# Patient Record
Sex: MALE | Race: WHITE | NOT HISPANIC OR LATINO | URBAN - METROPOLITAN AREA
[De-identification: names, ages, dates, MRNs, and addresses within clinical notes are randomized per-mention and may not be internally consistent; named-entity substitution may affect disease eponyms.]

---

## 2023-04-15 DIAGNOSIS — F51.01 PRIMARY INSOMNIA: Primary | ICD-10-CM

## 2023-04-17 RX ORDER — TRAZODONE HYDROCHLORIDE 100 MG/1
TABLET ORAL
Qty: 90 TABLET | Refills: 1 | Status: SHIPPED | OUTPATIENT
Start: 2023-04-17 | End: 2023-10-12

## 2023-05-01 ENCOUNTER — APPOINTMENT (OUTPATIENT)
Dept: PRIMARY CARE | Facility: CLINIC | Age: 63
End: 2023-05-01
Payer: COMMERCIAL

## 2023-05-05 ENCOUNTER — TELEPHONE (OUTPATIENT)
Dept: PRIMARY CARE | Facility: CLINIC | Age: 63
End: 2023-05-05
Payer: COMMERCIAL

## 2023-05-05 LAB
ALANINE AMINOTRANSFERASE (SGPT) (U/L) IN SER/PLAS: 11 U/L (ref 10–52)
ALBUMIN (G/DL) IN SER/PLAS: 4.4 G/DL (ref 3.4–5)
ALKALINE PHOSPHATASE (U/L) IN SER/PLAS: 68 U/L (ref 33–136)
ANION GAP IN SER/PLAS: 12 MMOL/L (ref 10–20)
ASPARTATE AMINOTRANSFERASE (SGOT) (U/L) IN SER/PLAS: 17 U/L (ref 9–39)
BASOPHILS (10*3/UL) IN BLOOD BY AUTOMATED COUNT: 0.06 X10E9/L (ref 0–0.1)
BASOPHILS/100 LEUKOCYTES IN BLOOD BY AUTOMATED COUNT: 1 % (ref 0–2)
BILIRUBIN TOTAL (MG/DL) IN SER/PLAS: 0.6 MG/DL (ref 0–1.2)
CALCIUM (MG/DL) IN SER/PLAS: 9.6 MG/DL (ref 8.6–10.3)
CARBON DIOXIDE, TOTAL (MMOL/L) IN SER/PLAS: 26 MMOL/L (ref 21–32)
CHLORIDE (MMOL/L) IN SER/PLAS: 104 MMOL/L (ref 98–107)
CREATININE (MG/DL) IN SER/PLAS: 2.22 MG/DL (ref 0.5–1.3)
EOSINOPHILS (10*3/UL) IN BLOOD BY AUTOMATED COUNT: 0.25 X10E9/L (ref 0–0.7)
EOSINOPHILS/100 LEUKOCYTES IN BLOOD BY AUTOMATED COUNT: 4.1 % (ref 0–6)
ERYTHROCYTE DISTRIBUTION WIDTH (RATIO) BY AUTOMATED COUNT: 13.6 % (ref 11.5–14.5)
ERYTHROCYTE MEAN CORPUSCULAR HEMOGLOBIN CONCENTRATION (G/DL) BY AUTOMATED: 32.5 G/DL (ref 32–36)
ERYTHROCYTE MEAN CORPUSCULAR VOLUME (FL) BY AUTOMATED COUNT: 95 FL (ref 80–100)
ERYTHROCYTES (10*6/UL) IN BLOOD BY AUTOMATED COUNT: 4.15 X10E12/L (ref 4.5–5.9)
GFR MALE: 33 ML/MIN/1.73M2
GLUCOSE (MG/DL) IN SER/PLAS: 111 MG/DL (ref 74–99)
HEMATOCRIT (%) IN BLOOD BY AUTOMATED COUNT: 39.4 % (ref 41–52)
HEMOGLOBIN (G/DL) IN BLOOD: 12.8 G/DL (ref 13.5–17.5)
IMMATURE GRANULOCYTES/100 LEUKOCYTES IN BLOOD BY AUTOMATED COUNT: 0.3 % (ref 0–0.9)
LEUKOCYTES (10*3/UL) IN BLOOD BY AUTOMATED COUNT: 6.2 X10E9/L (ref 4.4–11.3)
LYMPHOCYTES (10*3/UL) IN BLOOD BY AUTOMATED COUNT: 2.96 X10E9/L (ref 1.2–4.8)
LYMPHOCYTES/100 LEUKOCYTES IN BLOOD BY AUTOMATED COUNT: 48.1 % (ref 13–44)
MONOCYTES (10*3/UL) IN BLOOD BY AUTOMATED COUNT: 0.51 X10E9/L (ref 0.1–1)
MONOCYTES/100 LEUKOCYTES IN BLOOD BY AUTOMATED COUNT: 8.3 % (ref 2–10)
NEUTROPHILS (10*3/UL) IN BLOOD BY AUTOMATED COUNT: 2.35 X10E9/L (ref 1.2–7.7)
NEUTROPHILS/100 LEUKOCYTES IN BLOOD BY AUTOMATED COUNT: 38.2 % (ref 40–80)
NRBC (PER 100 WBCS) BY AUTOMATED COUNT: 0 /100 WBC (ref 0–0)
PLATELETS (10*3/UL) IN BLOOD AUTOMATED COUNT: 274 X10E9/L (ref 150–450)
POTASSIUM (MMOL/L) IN SER/PLAS: 3.9 MMOL/L (ref 3.5–5.3)
PROSTATE SPECIFIC ANTIGEN,SCREEN: 1.61 NG/ML (ref 0–4)
PROTEIN TOTAL: 6.9 G/DL (ref 6.4–8.2)
SODIUM (MMOL/L) IN SER/PLAS: 138 MMOL/L (ref 136–145)
UREA NITROGEN (MG/DL) IN SER/PLAS: 29 MG/DL (ref 6–23)

## 2023-05-05 NOTE — TELEPHONE ENCOUNTER
----- Message from Karla Medeiros MA sent at 5/5/2023 10:31 AM EDT -----  Pt had OV scheduled in [facility] for 5/1.  He is due for OV.  Please schedule. Thanks, CG  ----- Message -----  From: Js Villareal MD  Sent: 5/5/2023  10:13 AM EDT  To: #    I got blood work for this patient, but I do not see an upcoming visit.  Please check thank you

## 2023-06-05 ENCOUNTER — OFFICE VISIT (OUTPATIENT)
Dept: PRIMARY CARE | Facility: CLINIC | Age: 63
End: 2023-06-05
Payer: COMMERCIAL

## 2023-06-05 VITALS
OXYGEN SATURATION: 98 % | DIASTOLIC BLOOD PRESSURE: 80 MMHG | HEART RATE: 76 BPM | BODY MASS INDEX: 21.86 KG/M2 | WEIGHT: 148 LBS | SYSTOLIC BLOOD PRESSURE: 124 MMHG | TEMPERATURE: 98.2 F

## 2023-06-05 DIAGNOSIS — J42 CHRONIC BRONCHITIS, UNSPECIFIED CHRONIC BRONCHITIS TYPE (MULTI): ICD-10-CM

## 2023-06-05 DIAGNOSIS — I73.9 PERIPHERAL ARTERIAL DISEASE (CMS-HCC): ICD-10-CM

## 2023-06-05 DIAGNOSIS — I10 ESSENTIAL HYPERTENSION: ICD-10-CM

## 2023-06-05 DIAGNOSIS — F41.1 GENERALIZED ANXIETY DISORDER: Primary | ICD-10-CM

## 2023-06-05 DIAGNOSIS — R06.81 WITNESSED APNEIC SPELLS: ICD-10-CM

## 2023-06-05 DIAGNOSIS — N18.32 STAGE 3B CHRONIC KIDNEY DISEASE (MULTI): ICD-10-CM

## 2023-06-05 DIAGNOSIS — I25.10 ARTERIOSCLEROTIC CARDIOVASCULAR DISEASE: ICD-10-CM

## 2023-06-05 PROBLEM — K21.9 GASTROESOPHAGEAL REFLUX DISEASE: Status: ACTIVE | Noted: 2023-06-05

## 2023-06-05 PROBLEM — I70.221: Status: ACTIVE | Noted: 2023-06-05

## 2023-06-05 PROBLEM — J44.9 CHRONIC OBSTRUCTIVE LUNG DISEASE (MULTI): Status: ACTIVE | Noted: 2021-03-18

## 2023-06-05 PROBLEM — I65.29 CAROTID ARTERY STENOSIS: Status: ACTIVE | Noted: 2023-06-05

## 2023-06-05 PROCEDURE — 3074F SYST BP LT 130 MM HG: CPT | Performed by: FAMILY MEDICINE

## 2023-06-05 PROCEDURE — 3079F DIAST BP 80-89 MM HG: CPT | Performed by: FAMILY MEDICINE

## 2023-06-05 PROCEDURE — 99214 OFFICE O/P EST MOD 30 MIN: CPT | Performed by: FAMILY MEDICINE

## 2023-06-05 RX ORDER — ATORVASTATIN CALCIUM 80 MG/1
80 TABLET, FILM COATED ORAL DAILY
COMMUNITY
End: 2023-11-29 | Stop reason: SDUPTHER

## 2023-06-05 RX ORDER — LISINOPRIL AND HYDROCHLOROTHIAZIDE 12.5; 2 MG/1; MG/1
1 TABLET ORAL DAILY
COMMUNITY
End: 2023-06-05 | Stop reason: ALTCHOICE

## 2023-06-05 RX ORDER — CLOPIDOGREL BISULFATE 75 MG/1
TABLET ORAL DAILY
COMMUNITY
End: 2023-12-26 | Stop reason: SDUPTHER

## 2023-06-05 RX ORDER — CILOSTAZOL 100 MG/1
100 TABLET ORAL 2 TIMES DAILY
COMMUNITY
Start: 2018-01-24

## 2023-06-05 RX ORDER — ASPIRIN 81 MG/1
81 TABLET ORAL DAILY
COMMUNITY
Start: 2015-12-16

## 2023-06-05 RX ORDER — LISINOPRIL 20 MG/1
20 TABLET ORAL DAILY
Qty: 90 TABLET | Refills: 1 | Status: SHIPPED | OUTPATIENT
Start: 2023-06-05 | End: 2023-12-14 | Stop reason: SDUPTHER

## 2023-06-05 RX ORDER — FLUOXETINE HYDROCHLORIDE 40 MG/1
40 CAPSULE ORAL DAILY
COMMUNITY
Start: 2022-11-07

## 2023-06-05 ASSESSMENT — ENCOUNTER SYMPTOMS: DEPRESSION: 1

## 2023-06-05 NOTE — PROGRESS NOTES
Subjective   Patient ID: Hung Gaitan is a 62 y.o. male who presents for Depression (Recheck. Review labs.).  Depression      1. Depression/PTSD: doing fair.  Still reminded by memories of his son.    2. HTN: runs a bit low times.    3. lipids: well controlled    4. PAD: stable    5. CRI: Cr was getting worse.  Seeing nephrology.       Current Outpatient Medications on File Prior to Visit   Medication Sig Dispense Refill    aspirin 81 mg EC tablet Take 1 tablet (81 mg) by mouth once daily.      atorvastatin (Lipitor) 80 mg tablet Take 1 tablet (80 mg) by mouth once daily.      clopidogrel (Plavix) 75 mg tablet Take by mouth once daily.      traZODone (Desyrel) 100 mg tablet TAKE 1 TABLET BY MOUTH EVERYDAY AT BEDTIME 90 tablet 1    [DISCONTINUED] lisinopriL-hydrochlorothiazide 20-12.5 mg tablet Take 1 tablet by mouth once daily.      cilostazol (Pletal) 100 mg tablet Take 1 tablet (100 mg) by mouth 2 times a day.      FLUoxetine (PROzac) 40 mg capsule Take 1 capsule (40 mg) by mouth once daily.      hydrocortisone acetate 2.5 % cream with perineal applicator Apply topically.       No current facility-administered medications on file prior to visit.        Vitals:    06/05/23 1452   BP: 124/80   Pulse: 76   Temp: 36.8 °C (98.2 °F)   SpO2: 98%       Review of Systems   Psychiatric/Behavioral:  Positive for depression.    All other systems reviewed and are negative.      Objective     Physical Exam  Constitutional:       Appearance: Normal appearance. He is well-developed.   HENT:      Head: Atraumatic.   Cardiovascular:      Rate and Rhythm: Normal rate and regular rhythm.      Heart sounds: Normal heart sounds. No murmur heard.  Pulmonary:      Effort: Pulmonary effort is normal.      Breath sounds: Normal breath sounds.   Abdominal:      General: Bowel sounds are normal.      Palpations: Abdomen is soft.   Skin:     General: Skin is warm.   Neurological:      General: No focal deficit present.      Mental Status:  He is alert.   Psychiatric:         Mood and Affect: Mood normal.         Orders Only on 05/05/2023   Component Date Value Ref Range Status    Prostate Specific Antigen,Screen 05/05/2023 1.61  0.00 - 4.00 ng/mL Final    Comment: The FDA requires that the method used for PSA assay be   reported to the physician. Values obtained with different   assay methods must not be used interchangeably. This test   was performed at [facility] using the Siemens  [employer] PSA method, which is a sandwich immunoassay using   chemiluminescence for quantitation. The assay is approved  for measurement of prostate-specific antigen (PSA) in   serum and may be used in conjunction with a digital rectal  examination in men 50 years and older as an aid in   detection of prostate cancer.   5-Alpha-reductase inhibitors (e.g. Proscar, Finasteride,   Avodart, Dutasteride and Mercy) for the treatment of BPH   have been shown to lower PSA levels by an average of 50%   after 6 months of treatment.     Orders Only on 05/05/2023   Component Date Value Ref Range Status    Glucose 05/05/2023 111 (H)  74 - 99 mg/dL Final    Sodium 05/05/2023 138  136 - 145 mmol/L Final    Potassium 05/05/2023 3.9  3.5 - 5.3 mmol/L Final    Chloride 05/05/2023 104  98 - 107 mmol/L Final    Bicarbonate 05/05/2023 26  21 - 32 mmol/L Final    Anion Gap 05/05/2023 12  10 - 20 mmol/L Final    Urea Nitrogen 05/05/2023 29 (H)  6 - 23 mg/dL Final    Creatinine 05/05/2023 2.22 (H)  0.50 - 1.30 mg/dL Final    GFR MALE 05/05/2023 33 (A)  >90 mL/min/1.73m2 Final    Comment:  CALCULATIONS OF ESTIMATED GFR ARE PERFORMED   USING THE 2021 CKD-EPI STUDY REFIT EQUATION   WITHOUT THE RACE VARIABLE FOR THE IDMS-TRACEABLE   CREATININE METHODS.    https://jasn.asnjournals.org/content/early/2021/09/22/ASN.5881031628      Calcium 05/05/2023 9.6  8.6 - 10.3 mg/dL Final    Albumin 05/05/2023 4.4  3.4 - 5.0 g/dL Final    Alkaline Phosphatase 05/05/2023 68  33 - 136 U/L Final     Total Protein 05/05/2023 6.9  6.4 - 8.2 g/dL Final    AST 05/05/2023 17  9 - 39 U/L Final    Total Bilirubin 05/05/2023 0.6  0.0 - 1.2 mg/dL Final    ALT (SGPT) 05/05/2023 11  10 - 52 U/L Final    Comment:  Patients treated with Sulfasalazine may generate    falsely decreased results for ALT.     Orders Only on 05/05/2023   Component Date Value Ref Range Status    WBC 05/05/2023 6.2  4.4 - 11.3 x10E9/L Final    nRBC 05/05/2023 0.0  0.0 - 0.0 /100 WBC Final    RBC 05/05/2023 4.15 (L)  4.50 - 5.90 x10E12/L Final    Hemoglobin 05/05/2023 12.8 (L)  13.5 - 17.5 g/dL Final    Hematocrit 05/05/2023 39.4 (L)  41.0 - 52.0 % Final    MCV 05/05/2023 95  80 - 100 fL Final    MCHC 05/05/2023 32.5  32.0 - 36.0 g/dL Final    Platelets 05/05/2023 274  150 - 450 x10E9/L Final    RDW 05/05/2023 13.6  11.5 - 14.5 % Final    Neutrophils % 05/05/2023 38.2  40.0 - 80.0 % Final    Immature Granulocytes %, Automated 05/05/2023 0.3  0.0 - 0.9 % Final    Comment:  Immature Granulocyte Count (IG) includes promyelocytes,    myelocytes and metamyelocytes but does not include bands.   Percent differential counts (%) should be interpreted in the   context of the absolute cell counts (cells/L).      Lymphocytes % 05/05/2023 48.1  13.0 - 44.0 % Final    Monocytes % 05/05/2023 8.3  2.0 - 10.0 % Final    Eosinophils % 05/05/2023 4.1  0.0 - 6.0 % Final    Basophils % 05/05/2023 1.0  0.0 - 2.0 % Final    Neutrophils Absolute 05/05/2023 2.35  1.20 - 7.70 x10E9/L Final    Lymphocytes Absolute 05/05/2023 2.96  1.20 - 4.80 x10E9/L Final    Monocytes Absolute 05/05/2023 0.51  0.10 - 1.00 x10E9/L Final    Eosinophils Absolute 05/05/2023 0.25  0.00 - 0.70 x10E9/L Final    Basophils Absolute 05/05/2023 0.06  0.00 - 0.10 x10E9/L Final       Assessment/Plan   Problem List Items Addressed This Visit       Generalized anxiety disorder - Primary    Arteriosclerotic cardiovascular disease    Chronic obstructive lung disease (CMS/HCC)    Essential hypertension     Peripheral arterial disease (CMS/HCC)     Other Visit Diagnoses       Stage 3b chronic kidney disease              Patient is doing well.  Refilled pts meds.  Follow up in 6 mo.

## 2023-08-25 DIAGNOSIS — R06.81 WITNESSED APNEIC SPELLS: ICD-10-CM

## 2023-09-20 ENCOUNTER — OFFICE VISIT (OUTPATIENT)
Dept: PRIMARY CARE | Facility: CLINIC | Age: 63
End: 2023-09-20
Payer: COMMERCIAL

## 2023-09-20 VITALS
TEMPERATURE: 98.8 F | HEART RATE: 77 BPM | DIASTOLIC BLOOD PRESSURE: 72 MMHG | BODY MASS INDEX: 22 KG/M2 | OXYGEN SATURATION: 98 % | WEIGHT: 149 LBS | SYSTOLIC BLOOD PRESSURE: 112 MMHG

## 2023-09-20 DIAGNOSIS — G47.33 OBSTRUCTIVE SLEEP APNEA SYNDROME: Primary | ICD-10-CM

## 2023-09-20 PROCEDURE — 3078F DIAST BP <80 MM HG: CPT | Performed by: FAMILY MEDICINE

## 2023-09-20 PROCEDURE — 99213 OFFICE O/P EST LOW 20 MIN: CPT | Performed by: FAMILY MEDICINE

## 2023-09-20 PROCEDURE — 3074F SYST BP LT 130 MM HG: CPT | Performed by: FAMILY MEDICINE

## 2023-09-20 NOTE — PROGRESS NOTES
Subjective   Patient ID: Hung Gaitan is a 63 y.o. male who presents for Review Sleep Study Results (Discuss Sleep study results).  HPI patient presents for follow-up of his sleep study results.  His HSAT showed moderate sleep apnea.  They recommended a trial of CPAP.  Patient reports he frequently does not feel well rested when he wakes up in the morning.  Has had witnessed apneas in the past.  Has not had anything about a CPAP prescription yet.    Patient Active Problem List   Diagnosis    Generalized anxiety disorder    Arteriosclerotic cardiovascular disease    Attention deficit hyperactivity disorder, predominantly inattentive type    Carotid artery stenosis    Chronic obstructive lung disease (CMS/HCC)    Essential hypertension    Gastroesophageal reflux disease    Major depressive disorder    Mixed hyperlipidemia    Atherosclerosis of native artery of right leg with rest pain (CMS/HCC)    Peripheral arterial disease (CMS/HCC)       Social Connections: Not on file       Current Outpatient Medications on File Prior to Visit   Medication Sig Dispense Refill    aspirin 81 mg EC tablet Take 1 tablet (81 mg) by mouth once daily.      atorvastatin (Lipitor) 80 mg tablet Take 1 tablet (80 mg) by mouth once daily.      cilostazol (Pletal) 100 mg tablet Take 1 tablet (100 mg) by mouth 2 times a day.      clopidogrel (Plavix) 75 mg tablet Take by mouth once daily.      FLUoxetine (PROzac) 40 mg capsule Take 1 capsule (40 mg) by mouth once daily.      hydrocortisone acetate 2.5 % cream with perineal applicator Apply topically.      lisinopril 20 mg tablet Take 1 tablet (20 mg) by mouth once daily. 90 tablet 1    traZODone (Desyrel) 100 mg tablet TAKE 1 TABLET BY MOUTH EVERYDAY AT BEDTIME 90 tablet 1     No current facility-administered medications on file prior to visit.        Vitals:    09/20/23 1511   BP: 112/72   Pulse: 77   Temp: 37.1 °C (98.8 °F)   SpO2: 98%     Vitals:    09/20/23 1511   Weight: 67.6 kg (149  lb)       Review of Systems   All other systems reviewed and are negative.      Objective     Physical Exam  Constitutional:       Appearance: Normal appearance. He is well-developed.   HENT:      Head: Atraumatic.   Cardiovascular:      Rate and Rhythm: Normal rate and regular rhythm.      Heart sounds: Normal heart sounds. No murmur heard.  Pulmonary:      Effort: Pulmonary effort is normal.      Breath sounds: Normal breath sounds.   Abdominal:      General: Bowel sounds are normal.      Palpations: Abdomen is soft.   Skin:     General: Skin is warm.   Neurological:      General: No focal deficit present.      Mental Status: He is alert.   Psychiatric:         Mood and Affect: Mood normal.         No visits with results within 2 Month(s) from this visit.   Latest known visit with results is:   Orders Only on 05/05/2023   Component Date Value Ref Range Status    Prostate Specific Antigen,Screen 05/05/2023 1.61  0.00 - 4.00 ng/mL Final    Comment: The FDA requires that the method used for PSA assay be   reported to the physician. Values obtained with different   assay methods must not be used interchangeably. This test   was performed at [facility] using the Siemens  [employer] PSA method, which is a sandwich immunoassay using   chemiluminescence for quantitation. The assay is approved  for measurement of prostate-specific antigen (PSA) in   serum and may be used in conjunction with a digital rectal  examination in men 50 years and older as an aid in   detection of prostate cancer.   5-Alpha-reductase inhibitors (e.g. Proscar, Finasteride,   Avodart, Dutasteride and Mercy) for the treatment of BPH   have been shown to lower PSA levels by an average of 50%   after 6 months of treatment.         Assessment/Plan   Problem List Items Addressed This Visit    None  Visit Diagnoses       Obstructive sleep apnea syndrome    -  Primary    Relevant Orders    Positive Airway Pressure (PAP) Therapy           Ordered  CPAP machine for patient

## 2023-10-02 ENCOUNTER — APPOINTMENT (OUTPATIENT)
Dept: VASCULAR MEDICINE | Facility: HOSPITAL | Age: 63
End: 2023-10-02
Payer: COMMERCIAL

## 2023-10-02 ENCOUNTER — APPOINTMENT (OUTPATIENT)
Dept: VASCULAR SURGERY | Facility: HOSPITAL | Age: 63
End: 2023-10-02
Payer: COMMERCIAL

## 2023-10-12 DIAGNOSIS — F51.01 PRIMARY INSOMNIA: ICD-10-CM

## 2023-10-12 RX ORDER — TRAZODONE HYDROCHLORIDE 100 MG/1
TABLET ORAL
Qty: 90 TABLET | Refills: 1 | Status: SHIPPED | OUTPATIENT
Start: 2023-10-12 | End: 2024-03-11

## 2023-11-24 ENCOUNTER — LAB (OUTPATIENT)
Dept: LAB | Facility: LAB | Age: 63
End: 2023-11-24
Payer: COMMERCIAL

## 2023-11-24 DIAGNOSIS — I10 ESSENTIAL HYPERTENSION: ICD-10-CM

## 2023-11-24 LAB
ALBUMIN SERPL BCP-MCNC: 4.3 G/DL (ref 3.4–5)
ALP SERPL-CCNC: 72 U/L (ref 33–136)
ALT SERPL W P-5'-P-CCNC: 10 U/L (ref 10–52)
ANION GAP SERPL CALC-SCNC: 13 MMOL/L (ref 10–20)
AST SERPL W P-5'-P-CCNC: 17 U/L (ref 9–39)
BASOPHILS # BLD AUTO: 0.09 X10*3/UL (ref 0–0.1)
BASOPHILS NFR BLD AUTO: 1.6 %
BILIRUB SERPL-MCNC: 0.7 MG/DL (ref 0–1.2)
BUN SERPL-MCNC: 21 MG/DL (ref 6–23)
CALCIUM SERPL-MCNC: 9.1 MG/DL (ref 8.6–10.3)
CHLORIDE SERPL-SCNC: 107 MMOL/L (ref 98–107)
CO2 SERPL-SCNC: 25 MMOL/L (ref 21–32)
CREAT SERPL-MCNC: 1.86 MG/DL (ref 0.5–1.3)
EOSINOPHIL # BLD AUTO: 0.23 X10*3/UL (ref 0–0.7)
EOSINOPHIL NFR BLD AUTO: 4.2 %
ERYTHROCYTE [DISTWIDTH] IN BLOOD BY AUTOMATED COUNT: 14.5 % (ref 11.5–14.5)
GFR SERPL CREATININE-BSD FRML MDRD: 40 ML/MIN/1.73M*2
GLUCOSE SERPL-MCNC: 91 MG/DL (ref 74–99)
HCT VFR BLD AUTO: 43.4 % (ref 41–52)
HGB BLD-MCNC: 14.4 G/DL (ref 13.5–17.5)
IMM GRANULOCYTES # BLD AUTO: 0.01 X10*3/UL (ref 0–0.7)
IMM GRANULOCYTES NFR BLD AUTO: 0.2 % (ref 0–0.9)
LYMPHOCYTES # BLD AUTO: 2.61 X10*3/UL (ref 1.2–4.8)
LYMPHOCYTES NFR BLD AUTO: 47.1 %
MCH RBC QN AUTO: 31.8 PG (ref 26–34)
MCHC RBC AUTO-ENTMCNC: 33.2 G/DL (ref 32–36)
MCV RBC AUTO: 96 FL (ref 80–100)
MONOCYTES # BLD AUTO: 0.48 X10*3/UL (ref 0.1–1)
MONOCYTES NFR BLD AUTO: 8.7 %
NEUTROPHILS # BLD AUTO: 2.12 X10*3/UL (ref 1.2–7.7)
NEUTROPHILS NFR BLD AUTO: 38.2 %
NRBC BLD-RTO: 0 /100 WBCS (ref 0–0)
PLATELET # BLD AUTO: 280 X10*3/UL (ref 150–450)
POTASSIUM SERPL-SCNC: 4.7 MMOL/L (ref 3.5–5.3)
PROT SERPL-MCNC: 6.7 G/DL (ref 6.4–8.2)
RBC # BLD AUTO: 4.53 X10*6/UL (ref 4.5–5.9)
SODIUM SERPL-SCNC: 140 MMOL/L (ref 136–145)
WBC # BLD AUTO: 5.5 X10*3/UL (ref 4.4–11.3)

## 2023-11-24 PROCEDURE — 80053 COMPREHEN METABOLIC PANEL: CPT

## 2023-11-24 PROCEDURE — 85025 COMPLETE CBC W/AUTO DIFF WBC: CPT

## 2023-11-24 PROCEDURE — 36415 COLL VENOUS BLD VENIPUNCTURE: CPT

## 2023-11-28 DIAGNOSIS — I10 ESSENTIAL HYPERTENSION: ICD-10-CM

## 2023-11-29 DIAGNOSIS — I25.10 ARTERIOSCLEROTIC CARDIOVASCULAR DISEASE: Primary | ICD-10-CM

## 2023-11-29 RX ORDER — LISINOPRIL 20 MG/1
20 TABLET ORAL DAILY
Qty: 90 TABLET | Refills: 1 | OUTPATIENT
Start: 2023-11-29

## 2023-11-29 RX ORDER — ATORVASTATIN CALCIUM 80 MG/1
80 TABLET, FILM COATED ORAL DAILY
Qty: 90 TABLET | Refills: 0 | Status: SHIPPED | OUTPATIENT
Start: 2023-11-29 | End: 2024-02-26

## 2023-11-29 RX ORDER — ATORVASTATIN CALCIUM 80 MG/1
80 TABLET, FILM COATED ORAL NIGHTLY
Qty: 90 TABLET | Refills: 1 | OUTPATIENT
Start: 2023-11-29

## 2023-12-04 ENCOUNTER — APPOINTMENT (OUTPATIENT)
Dept: PRIMARY CARE | Facility: CLINIC | Age: 63
End: 2023-12-04
Payer: COMMERCIAL

## 2023-12-05 ENCOUNTER — APPOINTMENT (OUTPATIENT)
Dept: PRIMARY CARE | Facility: CLINIC | Age: 63
End: 2023-12-05
Payer: COMMERCIAL

## 2023-12-14 DIAGNOSIS — I10 ESSENTIAL HYPERTENSION: ICD-10-CM

## 2023-12-14 RX ORDER — LISINOPRIL 20 MG/1
20 TABLET ORAL DAILY
Qty: 90 TABLET | Refills: 1 | Status: SHIPPED | OUTPATIENT
Start: 2023-12-14 | End: 2024-04-30

## 2023-12-14 NOTE — TELEPHONE ENCOUNTER
Pt called Rx line asking for a refill on pended medication, pt has appt on 12/18/23 but is out of med. Please advise, BEE Singh

## 2023-12-26 ENCOUNTER — OFFICE VISIT (OUTPATIENT)
Dept: PRIMARY CARE | Facility: CLINIC | Age: 63
End: 2023-12-26
Payer: COMMERCIAL

## 2023-12-26 VITALS
BODY MASS INDEX: 22.15 KG/M2 | OXYGEN SATURATION: 97 % | DIASTOLIC BLOOD PRESSURE: 80 MMHG | TEMPERATURE: 97.6 F | HEART RATE: 76 BPM | SYSTOLIC BLOOD PRESSURE: 140 MMHG | WEIGHT: 150 LBS

## 2023-12-26 DIAGNOSIS — I10 ESSENTIAL HYPERTENSION: ICD-10-CM

## 2023-12-26 DIAGNOSIS — Z12.5 SCREENING FOR PROSTATE CANCER: ICD-10-CM

## 2023-12-26 DIAGNOSIS — N18.32 STAGE 3B CHRONIC KIDNEY DISEASE (MULTI): ICD-10-CM

## 2023-12-26 DIAGNOSIS — J01.00 ACUTE NON-RECURRENT MAXILLARY SINUSITIS: ICD-10-CM

## 2023-12-26 DIAGNOSIS — I25.10 ARTERIOSCLEROTIC CARDIOVASCULAR DISEASE: Primary | ICD-10-CM

## 2023-12-26 DIAGNOSIS — F41.1 GENERALIZED ANXIETY DISORDER: ICD-10-CM

## 2023-12-26 PROCEDURE — 3077F SYST BP >= 140 MM HG: CPT | Performed by: FAMILY MEDICINE

## 2023-12-26 PROCEDURE — 99214 OFFICE O/P EST MOD 30 MIN: CPT | Performed by: FAMILY MEDICINE

## 2023-12-26 PROCEDURE — 3079F DIAST BP 80-89 MM HG: CPT | Performed by: FAMILY MEDICINE

## 2023-12-26 RX ORDER — CLOPIDOGREL BISULFATE 75 MG/1
75 TABLET ORAL DAILY
Qty: 90 TABLET | Refills: 1 | Status: SHIPPED | OUTPATIENT
Start: 2023-12-26 | End: 2024-06-23

## 2023-12-26 RX ORDER — AMOXICILLIN 500 MG/1
1000 TABLET, FILM COATED ORAL EVERY 12 HOURS SCHEDULED
Qty: 40 TABLET | Refills: 0 | Status: SHIPPED | OUTPATIENT
Start: 2023-12-26 | End: 2024-01-05

## 2023-12-26 ASSESSMENT — ENCOUNTER SYMPTOMS: DEPRESSION: 1

## 2023-12-26 NOTE — PROGRESS NOTES
Subjective   Patient ID: Hung Gaitan is a 63 y.o. male who presents for Hyperlipidemia, Hypertension, Depression, and Anxiety (Recheck/Also having sinus pressure,ha's x 1 wk).  Depression    1. Depression/PTSD: doing fair.  Still reminded by memories of his son.  Had been helped with the therapist, but it was too expensive.    2. HTN: runs a bit low times.    3. lipids: well controlled    4. PAD: stable    5. CRI: Cr was better in November.       Current Outpatient Medications on File Prior to Visit   Medication Sig Dispense Refill    aspirin 81 mg EC tablet Take 1 tablet (81 mg) by mouth once daily.      atorvastatin (Lipitor) 80 mg tablet Take 1 tablet (80 mg) by mouth once daily. 90 tablet 0    cilostazol (Pletal) 100 mg tablet Take 1 tablet (100 mg) by mouth 2 times a day.      FLUoxetine (PROzac) 40 mg capsule Take 1 capsule (40 mg) by mouth once daily.      hydrocortisone acetate 2.5 % cream with perineal applicator Apply topically.      lisinopril 20 mg tablet Take 1 tablet (20 mg) by mouth once daily. 90 tablet 1    traZODone (Desyrel) 100 mg tablet TAKE 1 TABLET BY MOUTH EVERYDAY AT BEDTIME 90 tablet 1    [DISCONTINUED] clopidogrel (Plavix) 75 mg tablet Take by mouth once daily.       No current facility-administered medications on file prior to visit.        Vitals:    12/26/23 1448   BP: 140/80   Pulse: 76   Temp: 36.4 °C (97.6 °F)   SpO2: 97%       Review of Systems   All other systems reviewed and are negative.      Objective     Physical Exam  Constitutional:       Appearance: Normal appearance. He is well-developed.   HENT:      Head: Atraumatic.   Cardiovascular:      Rate and Rhythm: Normal rate and regular rhythm.      Heart sounds: Normal heart sounds. No murmur heard.  Pulmonary:      Effort: Pulmonary effort is normal.      Breath sounds: Normal breath sounds.   Abdominal:      General: Bowel sounds are normal.      Palpations: Abdomen is soft.   Skin:     General: Skin is warm.    Neurological:      General: No focal deficit present.      Mental Status: He is alert.   Psychiatric:         Mood and Affect: Mood normal.         Lab on 11/24/2023   Component Date Value Ref Range Status    Glucose 11/24/2023 91  74 - 99 mg/dL Final    Sodium 11/24/2023 140  136 - 145 mmol/L Final    Potassium 11/24/2023 4.7  3.5 - 5.3 mmol/L Final    Chloride 11/24/2023 107  98 - 107 mmol/L Final    Bicarbonate 11/24/2023 25  21 - 32 mmol/L Final    Anion Gap 11/24/2023 13  10 - 20 mmol/L Final    Urea Nitrogen 11/24/2023 21  6 - 23 mg/dL Final    Creatinine 11/24/2023 1.86 (H)  0.50 - 1.30 mg/dL Final    eGFR 11/24/2023 40 (L)  >60 mL/min/1.73m*2 Final    Calculations of estimated GFR are performed using the 2021 CKD-EPI Study Refit equation without the race variable for the IDMS-Traceable creatinine methods.  https://jasn.asnjournals.org/content/early/2021/09/22/ASN.6087407617    Calcium 11/24/2023 9.1  8.6 - 10.3 mg/dL Final    Albumin 11/24/2023 4.3  3.4 - 5.0 g/dL Final    Alkaline Phosphatase 11/24/2023 72  33 - 136 U/L Final    Total Protein 11/24/2023 6.7  6.4 - 8.2 g/dL Final    AST 11/24/2023 17  9 - 39 U/L Final    Bilirubin, Total 11/24/2023 0.7  0.0 - 1.2 mg/dL Final    ALT 11/24/2023 10  10 - 52 U/L Final    Patients treated with Sulfasalazine may generate falsely decreased results for ALT.    WBC 11/24/2023 5.5  4.4 - 11.3 x10*3/uL Final    nRBC 11/24/2023 0.0  0.0 - 0.0 /100 WBCs Final    RBC 11/24/2023 4.53  4.50 - 5.90 x10*6/uL Final    Hemoglobin 11/24/2023 14.4  13.5 - 17.5 g/dL Final    Hematocrit 11/24/2023 43.4  41.0 - 52.0 % Final    MCV 11/24/2023 96  80 - 100 fL Final    MCH 11/24/2023 31.8  26.0 - 34.0 pg Final    MCHC 11/24/2023 33.2  32.0 - 36.0 g/dL Final    RDW 11/24/2023 14.5  11.5 - 14.5 % Final    Platelets 11/24/2023 280  150 - 450 x10*3/uL Final    Neutrophils % 11/24/2023 38.2  40.0 - 80.0 % Final    Immature Granulocytes %, Automated 11/24/2023 0.2  0.0 - 0.9 % Final     Immature Granulocyte Count (IG) includes promyelocytes, myelocytes and metamyelocytes but does not include bands. Percent differential counts (%) should be interpreted in the context of the absolute cell counts (cells/UL).    Lymphocytes % 11/24/2023 47.1  13.0 - 44.0 % Final    Monocytes % 11/24/2023 8.7  2.0 - 10.0 % Final    Eosinophils % 11/24/2023 4.2  0.0 - 6.0 % Final    Basophils % 11/24/2023 1.6  0.0 - 2.0 % Final    Neutrophils Absolute 11/24/2023 2.12  1.20 - 7.70 x10*3/uL Final    Percent differential counts (%) should be interpreted in the context of the absolute cell counts (cells/uL).    Immature Granulocytes Absolute, Au* 11/24/2023 0.01  0.00 - 0.70 x10*3/uL Final    Lymphocytes Absolute 11/24/2023 2.61  1.20 - 4.80 x10*3/uL Final    Monocytes Absolute 11/24/2023 0.48  0.10 - 1.00 x10*3/uL Final    Eosinophils Absolute 11/24/2023 0.23  0.00 - 0.70 x10*3/uL Final    Basophils Absolute 11/24/2023 0.09  0.00 - 0.10 x10*3/uL Final       Assessment/Plan   Problem List Items Addressed This Visit       Generalized anxiety disorder    Arteriosclerotic cardiovascular disease - Primary    Relevant Medications    clopidogrel (Plavix) 75 mg tablet    Essential hypertension     Other Visit Diagnoses       Acute non-recurrent maxillary sinusitis        Relevant Medications    amoxicillin (Amoxil) 500 mg tablet    Stage 3b chronic kidney disease (CMS/HCC)              Patient is doing well.  Refilled pts meds.  Follow up in 6 mo.

## 2024-01-09 ENCOUNTER — APPOINTMENT (OUTPATIENT)
Dept: PRIMARY CARE | Facility: CLINIC | Age: 64
End: 2024-01-09
Payer: COMMERCIAL

## 2024-02-25 DIAGNOSIS — I25.10 ARTERIOSCLEROTIC CARDIOVASCULAR DISEASE: ICD-10-CM

## 2024-02-26 RX ORDER — ATORVASTATIN CALCIUM 80 MG/1
80 TABLET, FILM COATED ORAL DAILY
Qty: 90 TABLET | Refills: 1 | Status: SHIPPED | OUTPATIENT
Start: 2024-02-26

## 2024-03-09 DIAGNOSIS — F51.01 PRIMARY INSOMNIA: ICD-10-CM

## 2024-03-11 RX ORDER — TRAZODONE HYDROCHLORIDE 100 MG/1
TABLET ORAL
Qty: 90 TABLET | Refills: 1 | Status: SHIPPED | OUTPATIENT
Start: 2024-03-11

## 2024-03-25 ENCOUNTER — APPOINTMENT (OUTPATIENT)
Dept: PRIMARY CARE | Facility: CLINIC | Age: 64
End: 2024-03-25
Payer: COMMERCIAL

## 2024-04-01 ENCOUNTER — APPOINTMENT (OUTPATIENT)
Dept: PRIMARY CARE | Facility: CLINIC | Age: 64
End: 2024-04-01
Payer: COMMERCIAL

## 2024-04-08 ENCOUNTER — OFFICE VISIT (OUTPATIENT)
Dept: PRIMARY CARE | Facility: CLINIC | Age: 64
End: 2024-04-08
Payer: COMMERCIAL

## 2024-04-08 VITALS
HEART RATE: 68 BPM | TEMPERATURE: 97.1 F | DIASTOLIC BLOOD PRESSURE: 80 MMHG | OXYGEN SATURATION: 97 % | SYSTOLIC BLOOD PRESSURE: 140 MMHG | WEIGHT: 147 LBS | BODY MASS INDEX: 21.71 KG/M2

## 2024-04-08 DIAGNOSIS — R10.9 ABDOMINAL PAIN, UNSPECIFIED ABDOMINAL LOCATION: ICD-10-CM

## 2024-04-08 DIAGNOSIS — K29.50 CHRONIC GASTRITIS WITHOUT BLEEDING, UNSPECIFIED GASTRITIS TYPE: Primary | ICD-10-CM

## 2024-04-08 DIAGNOSIS — Z12.11 SCREENING FOR MALIGNANT NEOPLASM OF COLON: ICD-10-CM

## 2024-04-08 PROCEDURE — 3079F DIAST BP 80-89 MM HG: CPT | Performed by: FAMILY MEDICINE

## 2024-04-08 PROCEDURE — 99214 OFFICE O/P EST MOD 30 MIN: CPT | Performed by: FAMILY MEDICINE

## 2024-04-08 PROCEDURE — 3077F SYST BP >= 140 MM HG: CPT | Performed by: FAMILY MEDICINE

## 2024-04-08 RX ORDER — FAMOTIDINE 20 MG/1
20 TABLET, FILM COATED ORAL 2 TIMES DAILY
Qty: 180 TABLET | Refills: 1 | Status: SHIPPED | OUTPATIENT
Start: 2024-04-08 | End: 2024-10-05

## 2024-04-08 NOTE — PROGRESS NOTES
Subjective   Patient ID: Hung Gaitan is a 63 y.o. male who presents for pt c/o upset stomach x 2 months.  HPI  Pt with abdominal pain in epigastric region and lower for 2 mo.  Sometimes vomits when he gets up.  Drinks 2 ETOH per day.  Poor diet.  Due for scope.  No blood that he has seen.  Hasn't taken anything OTC.    Patient Active Problem List   Diagnosis    Generalized anxiety disorder    Arteriosclerotic cardiovascular disease    Attention deficit hyperactivity disorder, predominantly inattentive type    Carotid artery stenosis    Chronic obstructive lung disease (CMS/HCC)    Essential hypertension    Gastroesophageal reflux disease    Major depressive disorder    Mixed hyperlipidemia    Atherosclerosis of native artery of right leg with rest pain (CMS/HCC)    Peripheral arterial disease (CMS/HCC)       Social Connections: Not on file       Current Outpatient Medications on File Prior to Visit   Medication Sig Dispense Refill    aspirin 81 mg EC tablet Take 1 tablet (81 mg) by mouth once daily.      atorvastatin (Lipitor) 80 mg tablet TAKE 1 TABLET BY MOUTH ONCE DAILY. 90 tablet 1    cilostazol (Pletal) 100 mg tablet Take 1 tablet (100 mg) by mouth 2 times a day.      clopidogrel (Plavix) 75 mg tablet Take 1 tablet (75 mg) by mouth once daily. 90 tablet 1    FLUoxetine (PROzac) 40 mg capsule Take 1 capsule (40 mg) by mouth once daily.      hydrocortisone acetate 2.5 % cream with perineal applicator Apply topically.      lisinopril 20 mg tablet Take 1 tablet (20 mg) by mouth once daily. 90 tablet 1    traZODone (Desyrel) 100 mg tablet TAKE 1 TABLET BY MOUTH EVERYDAY AT BEDTIME 90 tablet 1     No current facility-administered medications on file prior to visit.        Vitals:    04/08/24 1125   BP: 140/80   Pulse: 68   Temp: 36.2 °C (97.1 °F)   SpO2: 97%     Vitals:    04/08/24 1125   Weight: 66.7 kg (147 lb)       Review of Systems   All other systems reviewed and are negative.      Objective     Physical  Exam  Constitutional:       Appearance: Normal appearance. He is well-developed.   HENT:      Head: Atraumatic.   Cardiovascular:      Rate and Rhythm: Normal rate and regular rhythm.      Heart sounds: Normal heart sounds. No murmur heard.  Pulmonary:      Effort: Pulmonary effort is normal.      Breath sounds: Normal breath sounds.   Abdominal:      General: Bowel sounds are normal.      Palpations: Abdomen is soft.   Skin:     General: Skin is warm.   Neurological:      General: No focal deficit present.      Mental Status: He is alert.   Psychiatric:         Mood and Affect: Mood normal.         No visits with results within 2 Month(s) from this visit.   Latest known visit with results is:   Lab on 11/24/2023   Component Date Value Ref Range Status    Glucose 11/24/2023 91  74 - 99 mg/dL Final    Sodium 11/24/2023 140  136 - 145 mmol/L Final    Potassium 11/24/2023 4.7  3.5 - 5.3 mmol/L Final    Chloride 11/24/2023 107  98 - 107 mmol/L Final    Bicarbonate 11/24/2023 25  21 - 32 mmol/L Final    Anion Gap 11/24/2023 13  10 - 20 mmol/L Final    Urea Nitrogen 11/24/2023 21  6 - 23 mg/dL Final    Creatinine 11/24/2023 1.86 (H)  0.50 - 1.30 mg/dL Final    eGFR 11/24/2023 40 (L)  >60 mL/min/1.73m*2 Final    Calculations of estimated GFR are performed using the 2021 CKD-EPI Study Refit equation without the race variable for the IDMS-Traceable creatinine methods.  https://jasn.asnjournals.org/content/early/2021/09/22/ASN.3707497338    Calcium 11/24/2023 9.1  8.6 - 10.3 mg/dL Final    Albumin 11/24/2023 4.3  3.4 - 5.0 g/dL Final    Alkaline Phosphatase 11/24/2023 72  33 - 136 U/L Final    Total Protein 11/24/2023 6.7  6.4 - 8.2 g/dL Final    AST 11/24/2023 17  9 - 39 U/L Final    Bilirubin, Total 11/24/2023 0.7  0.0 - 1.2 mg/dL Final    ALT 11/24/2023 10  10 - 52 U/L Final    Patients treated with Sulfasalazine may generate falsely decreased results for ALT.    WBC 11/24/2023 5.5  4.4 - 11.3 x10*3/uL Final    nRBC  11/24/2023 0.0  0.0 - 0.0 /100 WBCs Final    RBC 11/24/2023 4.53  4.50 - 5.90 x10*6/uL Final    Hemoglobin 11/24/2023 14.4  13.5 - 17.5 g/dL Final    Hematocrit 11/24/2023 43.4  41.0 - 52.0 % Final    MCV 11/24/2023 96  80 - 100 fL Final    MCH 11/24/2023 31.8  26.0 - 34.0 pg Final    MCHC 11/24/2023 33.2  32.0 - 36.0 g/dL Final    RDW 11/24/2023 14.5  11.5 - 14.5 % Final    Platelets 11/24/2023 280  150 - 450 x10*3/uL Final    Neutrophils % 11/24/2023 38.2  40.0 - 80.0 % Final    Immature Granulocytes %, Automated 11/24/2023 0.2  0.0 - 0.9 % Final    Immature Granulocyte Count (IG) includes promyelocytes, myelocytes and metamyelocytes but does not include bands. Percent differential counts (%) should be interpreted in the context of the absolute cell counts (cells/UL).    Lymphocytes % 11/24/2023 47.1  13.0 - 44.0 % Final    Monocytes % 11/24/2023 8.7  2.0 - 10.0 % Final    Eosinophils % 11/24/2023 4.2  0.0 - 6.0 % Final    Basophils % 11/24/2023 1.6  0.0 - 2.0 % Final    Neutrophils Absolute 11/24/2023 2.12  1.20 - 7.70 x10*3/uL Final    Percent differential counts (%) should be interpreted in the context of the absolute cell counts (cells/uL).    Immature Granulocytes Absolute, Au* 11/24/2023 0.01  0.00 - 0.70 x10*3/uL Final    Lymphocytes Absolute 11/24/2023 2.61  1.20 - 4.80 x10*3/uL Final    Monocytes Absolute 11/24/2023 0.48  0.10 - 1.00 x10*3/uL Final    Eosinophils Absolute 11/24/2023 0.23  0.00 - 0.70 x10*3/uL Final    Basophils Absolute 11/24/2023 0.09  0.00 - 0.10 x10*3/uL Final       Assessment/Plan   Problem List Items Addressed This Visit    None  Visit Diagnoses         Codes    Chronic gastritis without bleeding, unspecified gastritis type    -  Primary K29.50    Relevant Medications    famotidine (Pepcid) 20 mg tablet    Other Relevant Orders    EGD    Screening for malignant neoplasm of colon     Z12.11    Relevant Orders    Colonoscopy Screening; Average Risk Patient    Abdominal pain,  unspecified abdominal location     R10.9    Relevant Orders    EGD          Starting pt on H2 blockers.  Ref for scopes.

## 2024-04-30 DIAGNOSIS — I10 ESSENTIAL HYPERTENSION: ICD-10-CM

## 2024-04-30 RX ORDER — LISINOPRIL 20 MG/1
20 TABLET ORAL DAILY
Qty: 90 TABLET | Refills: 0 | Status: SHIPPED | OUTPATIENT
Start: 2024-04-30

## 2024-05-29 ENCOUNTER — LAB (OUTPATIENT)
Dept: LAB | Facility: LAB | Age: 64
End: 2024-05-29
Payer: COMMERCIAL

## 2024-05-29 DIAGNOSIS — N18.32 CHRONIC KIDNEY DISEASE, STAGE 3B (MULTI): Primary | ICD-10-CM

## 2024-05-29 LAB
25(OH)D3 SERPL-MCNC: 21 NG/ML (ref 30–100)
ALBUMIN SERPL BCP-MCNC: 4.2 G/DL (ref 3.4–5)
ANION GAP SERPL CALC-SCNC: 11 MMOL/L (ref 10–20)
BUN SERPL-MCNC: 23 MG/DL (ref 6–23)
CALCIUM SERPL-MCNC: 8.9 MG/DL (ref 8.6–10.3)
CHLORIDE SERPL-SCNC: 106 MMOL/L (ref 98–107)
CO2 SERPL-SCNC: 26 MMOL/L (ref 21–32)
CREAT SERPL-MCNC: 1.76 MG/DL (ref 0.5–1.3)
CREAT UR-MCNC: 19.9 MG/DL (ref 20–370)
CREAT UR-MCNC: 19.9 MG/DL (ref 20–370)
EGFRCR SERPLBLD CKD-EPI 2021: 43 ML/MIN/1.73M*2
GLUCOSE SERPL-MCNC: 100 MG/DL (ref 74–99)
MICROALBUMIN UR-MCNC: <7 MG/L
MICROALBUMIN/CREAT UR: ABNORMAL MG/G{CREAT}
PHOSPHATE SERPL-MCNC: 3.4 MG/DL (ref 2.5–4.9)
POTASSIUM SERPL-SCNC: 4.9 MMOL/L (ref 3.5–5.3)
PROT UR-ACNC: <4 MG/DL (ref 5–25)
PROT/CREAT UR: ABNORMAL MG/G{CREAT}
PTH-INTACT SERPL-MCNC: 167.5 PG/ML (ref 18.5–88)
SODIUM SERPL-SCNC: 138 MMOL/L (ref 136–145)

## 2024-05-29 PROCEDURE — 82306 VITAMIN D 25 HYDROXY: CPT

## 2024-05-29 PROCEDURE — 36415 COLL VENOUS BLD VENIPUNCTURE: CPT

## 2024-05-29 PROCEDURE — 82043 UR ALBUMIN QUANTITATIVE: CPT

## 2024-05-29 PROCEDURE — 83970 ASSAY OF PARATHORMONE: CPT

## 2024-05-29 PROCEDURE — 82570 ASSAY OF URINE CREATININE: CPT

## 2024-05-29 PROCEDURE — 84156 ASSAY OF PROTEIN URINE: CPT

## 2024-05-29 PROCEDURE — 80069 RENAL FUNCTION PANEL: CPT

## 2024-06-20 ENCOUNTER — APPOINTMENT (OUTPATIENT)
Dept: GASTROENTEROLOGY | Facility: CLINIC | Age: 64
End: 2024-06-20
Payer: COMMERCIAL

## 2024-06-20 ENCOUNTER — TELEPHONE (OUTPATIENT)
Dept: GASTROENTEROLOGY | Facility: CLINIC | Age: 64
End: 2024-06-20

## 2024-06-20 VITALS
WEIGHT: 145.2 LBS | BODY MASS INDEX: 20.79 KG/M2 | HEIGHT: 70 IN | DIASTOLIC BLOOD PRESSURE: 67 MMHG | SYSTOLIC BLOOD PRESSURE: 117 MMHG | HEART RATE: 75 BPM

## 2024-06-20 DIAGNOSIS — D36.9 TUBULAR ADENOMA: Primary | ICD-10-CM

## 2024-06-20 DIAGNOSIS — R63.4 WEIGHT LOSS, NON-INTENTIONAL: ICD-10-CM

## 2024-06-20 DIAGNOSIS — Z12.11 COLON CANCER SCREENING: Primary | ICD-10-CM

## 2024-06-20 DIAGNOSIS — N18.2 CHRONIC RENAL FAILURE, STAGE 2 (MILD): ICD-10-CM

## 2024-06-20 DIAGNOSIS — K52.9 CHRONIC DIARRHEA: ICD-10-CM

## 2024-06-20 PROCEDURE — 3074F SYST BP LT 130 MM HG: CPT | Performed by: INTERNAL MEDICINE

## 2024-06-20 PROCEDURE — 3078F DIAST BP <80 MM HG: CPT | Performed by: INTERNAL MEDICINE

## 2024-06-20 PROCEDURE — 99203 OFFICE O/P NEW LOW 30 MIN: CPT | Performed by: INTERNAL MEDICINE

## 2024-06-20 RX ORDER — OMEPRAZOLE 20 MG/1
CAPSULE, DELAYED RELEASE ORAL
COMMUNITY
Start: 2024-03-12

## 2024-06-20 RX ORDER — SODIUM, POTASSIUM,MAG SULFATES 17.5-3.13G
SOLUTION, RECONSTITUTED, ORAL ORAL
Qty: 354 ML | Refills: 0 | Status: SHIPPED | OUTPATIENT
Start: 2024-06-20

## 2024-06-20 NOTE — PROGRESS NOTES
This 60-year-old man is here worried about colon cancer he thinks she gets episodic diarrhea and some cramps.  He had a colonoscopy in 2019 and a tubular adenoma was removed from the rectum.  He thinks he had post polypectomy bleeding which resolved.  He is worried about colon cancer.  He has no upper GI symptoms he does take omeprazole from time to time he had an endoscopy in 2019 but biopsies were negative for H. pylori or [place]'s.    This gentleman has other major medical problems which consist of peripheral vascular disease requiring a stent in the right lower extremity and also left carotid endarterectomy.  He does take Plavix and aspirin.    Family history his father had chronic renal failure and dialysis.  His son  3 years ago apparently from Lyme's disease.  Episode was extremely traumatic for him.  He is a  in one of the [employer] in [place].  He is .  He does smoke a pack of cigarettes per day and drinks 2 beers per day.    Family history is positive for vascular disease and chronic renal failure and dialysis.  Negative for colorectal cancer.  Review of system is very active is as he is continues to be a smoker.  He denies any chest pain or angina cough sputum production he had a left carotid endarterectomy and peripheral vascular intervention with a stent in the right lower extremity.  Never had any stroke.  Denies shortness of breath feels extremely anxious and upset lost about 10 pounds intermittent cramps and diarrhea denies claudication he has history of chronic renal insufficiency he has good hypertension and hyperlipidemia vision hearing and normal no history of TIAs.    At the time of examination general he had a very anxious gentleman HEENT carotid endarterectomy scar was noted on the left side no carotid bruit thyroid is not enlarged JVD is flat heart sounds were normal chest is clear abdominal exam is negative hepatosplenomegaly ascites masses tenderness rigidity  or guarding good bowel sounds were heard no peripheral edema he has good peripheral vascular disease and has a stent in his right lower extremity mental status anxious without focal deficits.    Recommend he should have a colonoscopy examination he had previous tubular adenomas removed from the colon the patient is very worried about colon cancer.  He should stop the Plavix for 5 days prior to the procedure.    The patient is advised to stop smoking given his vascular disease.  Informed consent was obtained for colonoscopy he is very anxious to proceed with his examination.

## 2024-06-24 ENCOUNTER — APPOINTMENT (OUTPATIENT)
Dept: PRIMARY CARE | Facility: CLINIC | Age: 64
End: 2024-06-24
Payer: COMMERCIAL

## 2024-07-01 ENCOUNTER — APPOINTMENT (OUTPATIENT)
Dept: PRIMARY CARE | Facility: CLINIC | Age: 64
End: 2024-07-01
Payer: COMMERCIAL

## 2024-07-02 ENCOUNTER — APPOINTMENT (OUTPATIENT)
Dept: PRIMARY CARE | Facility: CLINIC | Age: 64
End: 2024-07-02
Payer: COMMERCIAL

## 2024-07-03 ENCOUNTER — APPOINTMENT (OUTPATIENT)
Dept: PRIMARY CARE | Facility: CLINIC | Age: 64
End: 2024-07-03
Payer: COMMERCIAL

## 2024-08-07 DIAGNOSIS — I10 ESSENTIAL HYPERTENSION: ICD-10-CM

## 2024-08-08 RX ORDER — LISINOPRIL 20 MG/1
20 TABLET ORAL DAILY
Qty: 90 TABLET | Refills: 0 | Status: SHIPPED | OUTPATIENT
Start: 2024-08-08

## 2024-08-15 DIAGNOSIS — I10 ESSENTIAL HYPERTENSION: ICD-10-CM

## 2024-08-15 RX ORDER — LISINOPRIL 20 MG/1
20 TABLET ORAL DAILY
Qty: 90 TABLET | Refills: 1 | Status: SHIPPED | OUTPATIENT
Start: 2024-08-15

## 2024-08-15 NOTE — TELEPHONE ENCOUNTER
Pt called rx line @ 12:34pm requesting RF on Lisinopril. [facility]    No upcoming OV  Last seen on 4/8/24  Ok for RF?  Pt due for OV?  Please advise. Thanks. JW

## 2024-09-06 DIAGNOSIS — I25.10 ARTERIOSCLEROTIC CARDIOVASCULAR DISEASE: ICD-10-CM

## 2024-09-10 RX ORDER — ATORVASTATIN CALCIUM 80 MG/1
80 TABLET, FILM COATED ORAL DAILY
Qty: 90 TABLET | Refills: 1 | Status: SHIPPED | OUTPATIENT
Start: 2024-09-10

## 2024-10-10 DIAGNOSIS — F51.01 PRIMARY INSOMNIA: ICD-10-CM

## 2024-10-11 NOTE — TELEPHONE ENCOUNTER
Patient does not have Ins right now. Should have it next month will call back to schedule/wh  Medicine needs refused

## 2024-10-14 RX ORDER — TRAZODONE HYDROCHLORIDE 100 MG/1
TABLET ORAL
Qty: 90 TABLET | Refills: 0 | Status: SHIPPED | OUTPATIENT
Start: 2024-10-14

## 2024-11-20 DIAGNOSIS — I25.10 ARTERIOSCLEROTIC CARDIOVASCULAR DISEASE: Primary | ICD-10-CM

## 2024-11-20 RX ORDER — CLOPIDOGREL BISULFATE 75 MG/1
75 TABLET ORAL DAILY
Qty: 90 TABLET | Refills: 1 | Status: SHIPPED | OUTPATIENT
Start: 2024-11-20